# Patient Record
Sex: FEMALE
[De-identification: names, ages, dates, MRNs, and addresses within clinical notes are randomized per-mention and may not be internally consistent; named-entity substitution may affect disease eponyms.]

---

## 2020-03-30 ENCOUNTER — APPOINTMENT (OUTPATIENT)
Dept: PHYSICAL MEDICINE AND REHAB | Facility: CLINIC | Age: 75
End: 2020-03-30

## 2020-12-09 ENCOUNTER — APPOINTMENT (OUTPATIENT)
Dept: PHYSICAL MEDICINE AND REHAB | Facility: CLINIC | Age: 75
End: 2020-12-09
Payer: MEDICARE

## 2020-12-09 VITALS
DIASTOLIC BLOOD PRESSURE: 78 MMHG | SYSTOLIC BLOOD PRESSURE: 152 MMHG | HEART RATE: 59 BPM | OXYGEN SATURATION: 96 % | WEIGHT: 194 LBS | BODY MASS INDEX: 34.38 KG/M2 | TEMPERATURE: 97.1 F | HEIGHT: 63 IN

## 2020-12-09 DIAGNOSIS — M54.17 RADICULOPATHY, LUMBOSACRAL REGION: ICD-10-CM

## 2020-12-09 DIAGNOSIS — Z86.19 PERSONAL HISTORY OF OTHER INFECTIOUS AND PARASITIC DISEASES: ICD-10-CM

## 2020-12-09 DIAGNOSIS — M67.01 SHORT ACHILLES TENDON (ACQUIRED), RIGHT ANKLE: ICD-10-CM

## 2020-12-09 DIAGNOSIS — Z86.79 PERSONAL HISTORY OF OTHER DISEASES OF THE CIRCULATORY SYSTEM: ICD-10-CM

## 2020-12-09 DIAGNOSIS — Z85.3 PERSONAL HISTORY OF MALIGNANT NEOPLASM OF BREAST: ICD-10-CM

## 2020-12-09 DIAGNOSIS — B02.9 RADICULOPATHY, LUMBOSACRAL REGION: ICD-10-CM

## 2020-12-09 DIAGNOSIS — M21.379 FOOT DROP, UNSPECIFIED FOOT: ICD-10-CM

## 2020-12-09 DIAGNOSIS — Z80.0 FAMILY HISTORY OF MALIGNANT NEOPLASM OF DIGESTIVE ORGANS: ICD-10-CM

## 2020-12-09 DIAGNOSIS — I89.0 LYMPHEDEMA, NOT ELSEWHERE CLASSIFIED: ICD-10-CM

## 2020-12-09 DIAGNOSIS — Z86.69 PERSONAL HISTORY OF OTHER DISEASES OF THE NERVOUS SYSTEM AND SENSE ORGANS: ICD-10-CM

## 2020-12-09 DIAGNOSIS — M67.00 SHORT ACHILLES TENDON (ACQUIRED), UNSPECIFIED ANKLE: ICD-10-CM

## 2020-12-09 DIAGNOSIS — M21.371 FOOT DROP, RIGHT FOOT: ICD-10-CM

## 2020-12-09 DIAGNOSIS — Z72.89 OTHER PROBLEMS RELATED TO LIFESTYLE: ICD-10-CM

## 2020-12-09 DIAGNOSIS — Z87.898 PERSONAL HISTORY OF OTHER SPECIFIED CONDITIONS: ICD-10-CM

## 2020-12-09 PROCEDURE — 99204 OFFICE O/P NEW MOD 45 MIN: CPT | Mod: GC

## 2020-12-14 RX ORDER — PREGABALIN 200 MG/1
200 CAPSULE ORAL
Refills: 0 | Status: ACTIVE | COMMUNITY

## 2020-12-14 RX ORDER — FOLIC ACID 1 MG/1
1 TABLET ORAL
Refills: 0 | Status: ACTIVE | COMMUNITY

## 2020-12-14 RX ORDER — MULTIVIT-MIN/FOLIC/VIT K/LYCOP 400-300MCG
50 MCG TABLET ORAL
Refills: 0 | Status: ACTIVE | COMMUNITY

## 2020-12-14 RX ORDER — LIDOCAINE 5% 700 MG/1
5 PATCH TOPICAL
Refills: 0 | Status: ACTIVE | COMMUNITY

## 2020-12-14 RX ORDER — ROSUVASTATIN CALCIUM 20 MG/1
20 TABLET, FILM COATED ORAL
Refills: 0 | Status: ACTIVE | COMMUNITY

## 2020-12-14 RX ORDER — FAMOTIDINE 20 MG/1
20 TABLET, FILM COATED ORAL
Refills: 0 | Status: ACTIVE | COMMUNITY

## 2020-12-14 RX ORDER — ASPIRIN 81 MG
81 TABLET, DELAYED RELEASE (ENTERIC COATED) ORAL
Refills: 0 | Status: ACTIVE | COMMUNITY

## 2020-12-14 RX ORDER — FEBUXOSTAT 40 MG/1
40 TABLET ORAL
Refills: 0 | Status: ACTIVE | COMMUNITY

## 2020-12-14 RX ORDER — METOPROLOL SUCCINATE 50 MG/1
50 TABLET, EXTENDED RELEASE ORAL
Refills: 0 | Status: ACTIVE | COMMUNITY

## 2020-12-14 RX ORDER — AMLODIPINE BESYLATE 5 MG/1
5 TABLET ORAL
Refills: 0 | Status: ACTIVE | COMMUNITY

## 2020-12-14 RX ORDER — DOCUSATE SODIUM 100 MG/1
CAPSULE ORAL
Refills: 0 | Status: ACTIVE | COMMUNITY

## 2020-12-14 RX ORDER — LORATADINE 10 MG
TABLET,DISINTEGRATING ORAL
Refills: 0 | Status: ACTIVE | COMMUNITY

## 2020-12-14 RX ORDER — TOCOPHERSOLAN (VITAMIN E TPGS) 400/15ML
LIQUID (ML) ORAL
Refills: 0 | Status: ACTIVE | COMMUNITY

## 2021-02-23 NOTE — ASSESSMENT
[FreeTextEntry1] : 75F with right drop foot due to post-herpetic radiculopathy. \par \par 1. Current brace is insufficient given weak dorsiflexors and tight heel cord. Patient has medical necessity for a custom articulating AFO with varus flange for to allow for increased stability during ambulation and to decrease patient’s risk for falls.  Patient cannot use a prefabricated AFO as there are no prefabricated AFOs designed to hold an inverted foot in a neutral position and because use will be for longer than six months (and likely permanent). Will order custom-molded hinged AFO with adjustable posterior stop, instep strap, full foot plate, flexible toe, prominent heel.\par \par Pt to follow up after brace receipt or sooner if needed.

## 2021-02-23 NOTE — PHYSICAL EXAM
[FreeTextEntry1] : General:  Awake and alert in no acute distress\par Psych: normal mood and affect\par HEENT: NC/AT, normal visual tracking\par Pulmonary: no resp distress, chest expansion appears symmetrical\par CV: extremities are warm and perfused\par Abd: non-distended\par Ext: no c/c/e\par \par Inspection: Non-antalgic gait. Mild right genu recurvatum.  Good heel strike with PLSO; toe strike without brace. Mild uncompensated trendelenberg gait. \par \par ROM: \par Left Hip: IR 40 ER 80\par Right Hip: IR 40 ER 80\par Tight heel cord on right: 1-2 deg shy of neutral with KF, ~10-15 deg shy of neutral with KE \par \par \par Strength:  HF, KE, KF, DF, PF, EHL all 5/5 except right PF 4/5 DF 2/5. Eversion 2+/5 and inverters 3/5 on right \par \par Sensation: Intact at L2-S1 dermatomes bilaterally to pinprick except diminished over L5-S1 on right\par \par Reflexes: diminished throughout \par Mute Babinski, no clonus bilat.\par \par \par \par

## 2021-02-23 NOTE — HISTORY OF PRESENT ILLNESS
[FreeTextEntry1] : Ms. Ruiz is a 75F PMH HTN, CAD,  atrial fib not on AC 2/2 bleeding, breast CA (dx 2014, s/p chemo/radiation and lumpectomy), lymphedema (LUE) due to breast CA treatment, neuropathy and right drop foot.  \par \par Drop foot started after she was diagnosed with shingles in 2017.  She also notes occasional numbness and paresthesias over the dorsum of her right foot for which she takes lyrica with good result.  She denies pain.  She has a right PLSO which is 5 years old. No issues with skin breakdown or rashes. [facility] goes to [facility] and was referred to this office by [facility] for a new orthotic.   \par \par Patient has had 1 fall in the last 6 months.  She was ambulating with her walker and fell into the tub. No injuries.  No near falls.  \par \par  Patient uses a walker for ambulation outside the home and a SAC for in-home ambulation.  She lives with her son in a  with 5 PRAMOD and 5 steps inside.  She uses a shower chair and is independent with ADLs.  She does not drive or work.

## 2024-08-19 NOTE — REVIEW OF SYSTEMS
Hide Additional Notes?: No Detail Level: Zone [Patient Intake Form Reviewed] : Patient intake form was reviewed [Hearing Loss] : loss of hearing [Muscle Weakness] : muscle weakness [Negative] : Heme/Lymph [FreeTextEntry9] : see HPI